# Patient Record
Sex: FEMALE | Race: WHITE | NOT HISPANIC OR LATINO | Employment: UNEMPLOYED | URBAN - METROPOLITAN AREA
[De-identification: names, ages, dates, MRNs, and addresses within clinical notes are randomized per-mention and may not be internally consistent; named-entity substitution may affect disease eponyms.]

---

## 2018-12-02 ENCOUNTER — OFFICE VISIT (OUTPATIENT)
Dept: URGENT CARE | Facility: CLINIC | Age: 33
End: 2018-12-02
Payer: COMMERCIAL

## 2018-12-02 VITALS
BODY MASS INDEX: 24.39 KG/M2 | OXYGEN SATURATION: 98 % | DIASTOLIC BLOOD PRESSURE: 65 MMHG | HEIGHT: 61 IN | WEIGHT: 129.2 LBS | TEMPERATURE: 97.5 F | SYSTOLIC BLOOD PRESSURE: 117 MMHG | HEART RATE: 67 BPM | RESPIRATION RATE: 20 BRPM

## 2018-12-02 DIAGNOSIS — J06.9 VIRAL URI WITH COUGH: Primary | ICD-10-CM

## 2018-12-02 DIAGNOSIS — J02.9 SORE THROAT: ICD-10-CM

## 2018-12-02 LAB — S PYO AG THROAT QL: NEGATIVE

## 2018-12-02 PROCEDURE — 87070 CULTURE OTHR SPECIMN AEROBIC: CPT | Performed by: PHYSICIAN ASSISTANT

## 2018-12-02 PROCEDURE — 99203 OFFICE O/P NEW LOW 30 MIN: CPT | Performed by: PHYSICIAN ASSISTANT

## 2018-12-02 NOTE — PATIENT INSTRUCTIONS
Rapid strep negative, will send for culture and call if positive  Salt water gargles, tylenol, and motrin for sore throat  Watch for fevers  Continue sinus rinses and mucinex as needed for symptomatic relief  Follow up with your PCP for persistent symptoms  Go to the ER for any distress

## 2018-12-02 NOTE — PROGRESS NOTES
[id number]        NAME: Zhanna Robert is a 28 y o  female  : 1985    MRN: [id number]  DATE: 2018  TIME: 8:32 AM    Assessment and Plan   Viral URI with cough [J06 [id number], B97 [id number]]  1  Viral URI with cough     2  Sore throat  POCT rapid strepA    Throat culture         Patient Instructions     Rapid strep negative, will send for culture and call if positive  Salt water gargles, tylenol, and motrin for sore throat  Watch for fevers  Continue sinus rinses and mucinex as needed for symptomatic relief  Follow up with PCP in 3-5 days  Proceed to  ER if symptoms worsen  Chief Complaint     Chief Complaint   Patient presents with    Sore Throat     patient reports she has had a sore throat x 3 days, history of strep  no fever or chills, productive cough of yellow mucus in the am            History of Present Illness       This is a 28year old female presenting for sore throat x 3 days  Associated symptoms include sinus congestion, rhinorrhea, dry cough  No fevers, abdominal pain, nausea, vomiting, diarrhea, ear pain  She has been doing sinus rinses and using mucinex for sinus symptoms  She does have a history of frequent strep but has not had it in a while  Review of Systems   Review of Systems   Constitutional: Negative for fever  HENT: Positive for congestion, postnasal drip, rhinorrhea, sinus pressure, sore throat and trouble swallowing  Negative for ear pain  Respiratory: Positive for cough  Negative for chest tightness and shortness of breath  Gastrointestinal: Negative for abdominal pain, nausea and vomiting  Neurological: Negative for dizziness and headaches  Current Medications     No current outpatient prescriptions on file      Current Allergies     Allergies as of 2018 - Reviewed 2018   Allergen Reaction Noted    Pineapple Itching 2016    Other  2015    Prednisone Other (See Comments) 2013            The following portions of the patient's history were reviewed and updated as appropriate: allergies, current medications, past family history, past medical history, past social history, past surgical history and problem list      Past Medical History:   Diagnosis Date    Allergic     Asthma        Past Surgical History:   Procedure Laterality Date    APPENDECTOMY       SECTION         No family history on file  Medications have been verified  Objective   /65   Pulse 67   Temp 97 5 °F (36 4 °C)   Resp 20   Ht 5' 1" (1 549 m)   Wt 58 6 kg (129 lb 3 2 oz)   LMP 2018   SpO2 98%   BMI 24 41 kg/m²        Physical Exam     Physical Exam   Constitutional: She appears well-developed and well-nourished  No distress  HENT:   Right Ear: Tympanic membrane, external ear and ear canal normal    Left Ear: Tympanic membrane, external ear and ear canal normal    Nose: Nose normal    Mouth/Throat: Uvula is midline and mucous membranes are normal  Posterior oropharyngeal edema and posterior oropharyngeal erythema present  No oropharyngeal exudate or tonsillar abscesses  Posterior pharynx erythematous and slightly edematous, no exudate or abscess  Eyes: Conjunctivae are normal    Neck: Normal range of motion  Neck supple  Cardiovascular: Normal rate, regular rhythm and normal heart sounds  Pulmonary/Chest: Effort normal and breath sounds normal  No respiratory distress  She has no decreased breath sounds  She has no wheezes  She has no rales  Lymphadenopathy:     She has cervical adenopathy (small anterior cervical LAD)  Neurological: She is alert  Skin: Skin is warm and dry  She is not diaphoretic  Nursing note and vitals reviewed

## 2018-12-04 LAB — BACTERIA THROAT CULT: NORMAL

## 2020-01-24 ENCOUNTER — HOSPITAL ENCOUNTER (OUTPATIENT)
Dept: RADIOLOGY | Facility: HOSPITAL | Age: 35
Discharge: HOME | End: 2020-01-24
Attending: OTOLARYNGOLOGY
Payer: COMMERCIAL

## 2020-01-24 DIAGNOSIS — R59.0 CERVICAL LYMPHADENOPATHY: ICD-10-CM

## 2020-01-24 PROCEDURE — 70491 CT SOFT TISSUE NECK W/DYE: CPT

## 2020-01-24 RX ADMIN — IOHEXOL 85 ML: 350 INJECTION, SOLUTION INTRAVENOUS at 19:15
